# Patient Record
Sex: FEMALE | Race: WHITE | Employment: OTHER | URBAN - METROPOLITAN AREA
[De-identification: names, ages, dates, MRNs, and addresses within clinical notes are randomized per-mention and may not be internally consistent; named-entity substitution may affect disease eponyms.]

---

## 2020-08-05 ENCOUNTER — HOSPITAL ENCOUNTER (OUTPATIENT)
Age: 65
Setting detail: SPECIMEN
Discharge: HOME | End: 2020-08-05
Payer: COMMERCIAL

## 2020-08-11 LAB — CYTOLOGY REPORT: NORMAL

## 2021-08-18 ENCOUNTER — OFFICE VISIT (OUTPATIENT)
Dept: OBGYN CLINIC | Age: 66
End: 2021-08-18
Payer: COMMERCIAL

## 2021-08-18 ENCOUNTER — HOSPITAL ENCOUNTER (OUTPATIENT)
Age: 66
Setting detail: SPECIMEN
Discharge: HOME | End: 2021-08-18
Payer: COMMERCIAL

## 2021-08-18 VITALS
WEIGHT: 110 LBS | BODY MASS INDEX: 18.78 KG/M2 | DIASTOLIC BLOOD PRESSURE: 72 MMHG | SYSTOLIC BLOOD PRESSURE: 100 MMHG | HEIGHT: 64 IN

## 2021-08-18 DIAGNOSIS — Z01.419 WOMEN'S ANNUAL ROUTINE GYNECOLOGICAL EXAMINATION: Primary | ICD-10-CM

## 2021-08-18 PROCEDURE — 99397 PER PM REEVAL EST PAT 65+ YR: CPT | Performed by: OBSTETRICS & GYNECOLOGY

## 2021-08-18 RX ORDER — TRIAMCINOLONE ACETONIDE 0.25 MG/G
OINTMENT TOPICAL
COMMUNITY
Start: 2021-08-17 | End: 2022-08-24

## 2021-08-18 RX ORDER — CYCLOSPORINE 0.5 MG/ML
EMULSION OPHTHALMIC
COMMUNITY
Start: 2021-07-29

## 2021-08-18 RX ORDER — B-COMPLEX WITH VITAMIN C
TABLET ORAL
COMMUNITY

## 2021-08-18 RX ORDER — VALACYCLOVIR HYDROCHLORIDE 500 MG/1
TABLET, FILM COATED ORAL
COMMUNITY
Start: 2021-08-16 | End: 2022-08-24

## 2021-08-18 ASSESSMENT — ENCOUNTER SYMPTOMS
SHORTNESS OF BREATH: 0
DIARRHEA: 0
CONSTIPATION: 0
ABDOMINAL PAIN: 0

## 2021-08-18 NOTE — PATIENT INSTRUCTIONS
pregnant. It could harm the unborn baby or cause birth defects. Use effective birth control to prevent pregnancy while you are using this medicine. You may need to have a negative pregnancy test before starting this treatment. You should not breastfeed while using ospemifene. Ospemifene is not approved for use by anyone younger than 25years old. How should I take ospemifene? Follow all directions on your prescription label and read all medication guides or instruction sheets. Use the medicine exactly as directed. Take with food. Ospemifene may increase your risk of developing a condition that can lead to uterine cancer. To help lower this risk, your doctor may also want you to take a progestin. Report any unusual vaginal bleeding right away. Your doctor should check your progress on a regular basis to determine whether you should continue this treatment. Self-examine your breasts for lumps on a monthly basis, and have regular pelvic exams and mammograms while taking ospemifene. If you need major surgery or will be on long-term bed rest, you may need to stop using this medicine for a short time. Any doctor or surgeon who treats you should know that you are using ospemifene. Store at room temperature away from moisture and heat. What happens if I miss a dose? Take the medicine as soon as you can, but skip the missed dose if it is almost time for your next dose. Do not take two doses at one time. What happens if I overdose? Seek emergency medical attention or call the Poison Help line at 1-682.617.6933. What should I avoid while taking ospemifene? Follow your doctor's instructions about any restrictions on food, beverages, or activity. What are the possible side effects of ospemifene? Get emergency medical help if you have any of these signs of an allergic reaction: hives; difficult breathing; swelling of your face, lips, tongue, or throat.   Call your doctor at once if you have:  · signs of a stroke --sudden numbness or weakness (especially on one side of the body), severe headache, slurred speech, balance problems;  · signs of a blood clot in the lung --chest pain, sudden cough, wheezing, rapid breathing, coughing up blood;  · signs of a blood clot deep in the body --swelling, warmth, or redness in an arm or leg; or  · any unusual vaginal bleeding. Common side effects may include:  · vaginal discharge or heavy bleeding;  · hot flashes;  · headache;  · increased sweating, night sweats; or  · muscle spasm. This is not a complete list of side effects and others may occur. Call your doctor for medical advice about side effects. You may report side effects to FDA at 7-748-FDA-3517. What other drugs will affect ospemifene? Sometimes it is not safe to use certain medications at the same time. Some drugs can affect your blood levels of other drugs you take, which may increase side effects or make the medications less effective. Tell your doctor about all your other medicines, especially:  · birth control pills; or  · hormone replacement therapy. Where can I get more information? Your pharmacist can provide more information about ospemifene. Remember, keep this and all other medicines out of the reach of children, never share your medicines with others, and use this medication only for the indication prescribed. Every effort has been made to ensure that the information provided by [address] is accurate, up-to-date, and complete, but no guarantee is made to that effect. Drug information contained herein may be time sensitive. [facility] information has been compiled for use by healthcare practitioners and consumers in the [employer] and therefore [facility] does not warrant that uses outside of the [employer] are appropriate, unless specifically indicated otherwise. [facility]'s drug information does not endorse drugs, diagnose patients or recommend therapy.  [facility]'s drug information is an informational resource designed to assist licensed healthcare practitioners in caring for their patients and/or to serve consumers viewing this service as a supplement to, and not a substitute for, the expertise, skill, knowledge and judgment of healthcare practitioners. The absence of a warning for a given drug or drug combination in no way should be construed to indicate that the drug or drug combination is safe, effective or appropriate for any given patient. [facility] does not assume any responsibility for any aspect of healthcare administered with the aid of information [facility] provides. The information contained herein is not intended to cover all possible uses, directions, precautions, warnings, drug interactions, allergic reactions, or adverse effects. If you have questions about the drugs you are taking, check with your doctor, nurse or pharmacist.  Copyright 5026-4861 [address]. Version: 3.01. Revision date: 1/31/2020. Care instructions adapted under license by [facility] ([facility]). If you have questions about a medical condition or this instruction, always ask your healthcare professional. [place] [address] any warranty or liability for your use of this information.

## 2021-08-18 NOTE — PROGRESS NOTES
DATE OF VISIT:  21  PATIENT NAME:  Rehana Barnes     YOB: 1955    76 y.o. Chief Complaint   Patient presents with    Annual Exam     Last mamm 2021. bone density . colonoscopy . Needs pap. No LMP recorded. Patient is postmenopausal.           Primary Care Physician: Garett Clemens DO    The patient was seen and examined. She has no chief complaint today and is here for her annual exam.  Her bowels are regular. There are no voiding complaints. She denies any bloating. She denies vaginal discharge and was counseled on STD's and the need for barriercontraception. HPI : Rehana Barnes is a 76 y.o. female  who presents today for her annual.  Has worsening vaginal atrophy. Was on new medication last year that was monthly. She has appt with pcp coming up to get next bone density. 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     Glaucoma     History of skin cancer     Osteoporosis                                                                    Past Surgical History:   Procedure Laterality Date    CERVIX BIOPSY      cone biopsy -  carcinoma in situ    FIXATION KYPHOPLASTY      L#    HYSTERECTOMY, VAGINAL  2011     Family History   Problem Relation Age of Onset    Heart Disease Father     Hypertension Father     Stroke Father     Hypertension Mother      Social History     Socioeconomic History    Marital status: Unknown     Spouse name: Not on file    Number of children: Not on file    Years of education: Not on file    Highest education level: Not on file   Occupational History    Not on file   Tobacco Use    Smoking status: Never Smoker    Smokeless tobacco: Never Used   Substance and Sexual Activity    Alcohol use: Yes     Comment: couple times/week    Drug use: Never    Sexual activity: Not on file   Other Topics Concern    Not on file   Social History Narrative    Not on file     Social Determinants of Health     Financial Resource Strain:     Difficulty of Paying Living Expenses:    Food Insecurity:     Worried About Running Out of Food in the Last Year:     [address] in the Last Year:    Transportation Needs:     Lack of Transportation (Medical):  Lack of Transportation (Non-Medical):    Physical Activity:     Days of Exercise per Week:     Minutes of Exercise per Session:    Stress:     Feeling of Stress :    Social Connections:     Frequency of Communication with Friends and Family:     Frequency of Social Gatherings with Friends and Family:     Attends [employer] Services:     Active Member of Clubs or Organizations:     Attends Club or Organization Meetings:     Marital Status:    Intimate Partner Violence:     Fear of Current or Ex-Partner:     Emotionally Abused:     Physically Abused:     Sexually Abused:      Vitals:    08/18/21 0740   BP: 100/72   Site: Right Upper Arm   Position: Sitting   Weight: 110 lb (49.9 kg)   Height: 5' 3.5\" (1.613 m)     Body mass index is 19.18 kg/m². No LMP recorded.  Patient is postmenopausal.    MEDICATIONS:  Current Outpatient Medications   Medication Sig Dispense Refill    RESTASIS 0.05 % ophthalmic emulsion Instill 1 drop into each eye twice daily      Calcium Carbonate-Vitamin D (OYSTER SHELL CALCIUM/D) 500-200 MG-UNIT TABS Take by mouth      Efinaconazole 10 % SOLN Apply topically      triamcinolone (KENALOG) 0.025 % ointment Apply topically      Omega-3 Fatty Acids (OMEGA 3 PO) Take by mouth      Denosumab (PROLIA SC) Inject into the skin      valACYclovir (VALTREX) 500 MG tablet TAKE 1 TABLET BY MOUTH TWO TIMES A DAY FOR 3 DAYS AS NEEDED for outbreak (Patient not taking: Reported on 8/18/2021)       No current facility-administered medications for this visit. ALLERGIES:  Allergies as of 08/18/2021 - Fully Reviewed 08/18/2021   Allergen Reaction Noted    Adhesive tape  08/18/2021    Bactrim [sulfamethoxazole-trimethoprim] Rash 08/05/2021    Penicillins Rash 08/05/2021           Symptoms of decreased mood absent    **If either question is answered in a  positive fashion then completethe PHQ9 Scoring Evaluation and make the appropriate referral**      Gynecologic History:   No LMP recorded. Patient is postmenopausal.    Sexually Active: Yes    STD History: Yes hpv     Hormone Replacement Exposure: Yes vag estrogen at one time      Genetic Qualified Family History of Breast, Ovarian , Colon or Uterine Cancer:No   If YES see scanned worksheet. Preventative Health Testing:  Colposcopy History:   Date of Last Mammogram: 2021  Date of Last Colonoscopy:   Date of Last Bone Density:      ______________________________________________________________________    REVIEW OF SYSTEMS:       Review of Systems   Constitutional: Negative for chills, fatigue and fever. Respiratory: Negative for shortness of breath. Cardiovascular: Negative for chest pain. Gastrointestinal: Negative for abdominal pain, constipation and diarrhea. Genitourinary: Positive for dyspareunia. Negative for dysuria, enuresis, frequency, menstrual problem, pelvic pain, urgency and vaginal bleeding. Neurological: Negative for dizziness, light-headedness and headaches. PHYSICAL EXAM:    Physical Exam  Constitutional:       Appearance: Normal appearance. Genitourinary:      Pelvic exam was performed with patient in the lithotomy position. Vulva, vagina, right adnexa and left adnexa normal.      Vaginal atrophy present. Cervix is absent. Uterus is absent.    HENT:      Head: Atraumatic. Mouth/Throat:      Mouth: Mucous membranes are moist.   Eyes:      Extraocular Movements: Extraocular movements intact. Pupils: Pupils are equal, round, and reactive to light. Cardiovascular:      Rate and Rhythm: Normal rate. Pulmonary:      Effort: Pulmonary effort is normal.   Chest:      Breasts:         Right: Normal.         Left: Normal.   Abdominal:      General: There is no distension. Palpations: Abdomen is soft. Tenderness: There is no abdominal tenderness. Musculoskeletal:         General: Normal range of motion. Cervical back: Normal range of motion. Neurological:      Mental Status: She is alert and oriented to person, place, and time. Skin:     General: Skin is warm and dry. Psychiatric:         Mood and Affect: Mood normal.         Behavior: Behavior normal.         Thought Content: Thought content normal.         Judgment: Judgment normal.   Chaperone present: chaperone declined. POC Cultures:  No results found for this visit on 08/18/21. ASSESSMENT:      76 y.o. Annual  1. Women's annual routine gynecological examination          There are no problems to display for this patient. Hereditary Breast, Ovarian, Colon and Uterine Cancer screening Done. Tobacco & Secondary smoke risks reviewed; instructed on cessation and avoidance    PLAN:    Return in about 1 year (around 8/18/2022) for annual.  Orders Placed This Encounter   Procedures    PAP SMEAR   Pt will consider osphena or vagifem and let us know. Repeat Annual every 1 year  Cervical Cytology Evaluation begins at 24years old. If Negative Cytology, Follow-up screening per current guidelines. Mammograms every 1year if 37 yo and last mammogram was negative. Calcium and Vitamin D dosing reviewed. Colonoscopy screening reviewed as well as onset for bone density testing. Routine healthmaintenance per patients PCP.     Electronicallysigned by:  Clarissa Sarkar Douglas Arana DO on 08/18/21

## 2021-08-20 LAB
HPV SAMPLE: ABNORMAL
HPV, GENOTYPE 16: NOT DETECTED
HPV, GENOTYPE 18: NOT DETECTED
HPV, HIGH RISK OTHER: DETECTED
HPV, INTERPRETATION: ABNORMAL
SPECIMEN DESCRIPTION: ABNORMAL

## 2021-08-31 LAB — CYTOLOGY REPORT: NORMAL

## 2021-09-16 ENCOUNTER — TELEPHONE (OUTPATIENT)
Dept: OBGYN CLINIC | Age: 66
End: 2021-09-16

## 2021-11-04 ENCOUNTER — PROCEDURE VISIT (OUTPATIENT)
Dept: OBGYN CLINIC | Age: 66
End: 2021-11-04
Payer: COMMERCIAL

## 2021-11-04 ENCOUNTER — HOSPITAL ENCOUNTER (OUTPATIENT)
Age: 66
Setting detail: SPECIMEN
Discharge: HOME | End: 2021-11-04
Payer: COMMERCIAL

## 2021-11-04 DIAGNOSIS — R87.611 PAP SMEAR OF CERVIX WITH ASCUS, CANNOT EXCLUDE HGSIL: Primary | ICD-10-CM

## 2021-11-04 PROCEDURE — 57421 EXAM/BIOPSY OF VAG W/SCOPE: CPT | Performed by: OBSTETRICS & GYNECOLOGY

## 2021-11-04 NOTE — PROGRESS NOTES
Colposcopy Procedure Note    Indications: Pap smear 1 months ago showed: ASC cannot exclude high grade lesion [facility]). The prior pap showed inflammation. Prior cervical/vaginal disease: hx of cervical dysplasia before hysterectomy. Prior cervical treatment: hysterectomy. Procedure Details   The risks and benefits of the procedure and Verbal informed consent obtained. Speculum placed in vagina and excellent visualization of cervix achieved, cervix swabbed x 3 with acetic acid solution. Findings:  Cervix: acetowhite lesion(s) noted at 9 o'clock; endocervical lesion noted at 9 o'clock, vaginal biopsies taken at 9 o'clock, specimen labelled and sent to pathology and hemostasis achieved with Monsel's solution. Vaginal inspection: normal without visible lesions. Vulvar colposcopy: vulvar colposcopy not performed. Specimens: 8:27    Complications: none. Plan:  Specimens labelled and sent to Pathology. Will base further treatment on Pathology findings. Treatment options discussed with patient.

## 2021-11-08 ENCOUNTER — TELEPHONE (OUTPATIENT)
Dept: OBGYN CLINIC | Age: 66
End: 2021-11-08

## 2021-11-08 NOTE — TELEPHONE ENCOUNTER
Received a call from a pathologist at [facility], patient was here for a colpo last week and he needed to confirm location. Your office note is documented that she has a cervix and the biopsy site is endocervical and vaginal at 9:00. Confirmed that it is a vaginal biopsy as patient has had a hyst.  You may want to modify your note to specify that she does not have a cervix.

## 2021-11-09 LAB — SURGICAL PATHOLOGY REPORT: NORMAL

## 2022-06-20 ENCOUNTER — TELEPHONE (OUTPATIENT)
Dept: OBGYN CLINIC | Age: 67
End: 2022-06-20

## 2022-06-20 NOTE — TELEPHONE ENCOUNTER
Had a reminder in [facility] that patient needs to use vaginal estrogen for 4 weeks before her next pap per Dr. Pavon Plants. Sample of Premarin vaginal cream up front for patient to . Patient verbalized understanding, no further questions/concerns voiced.

## 2022-08-11 NOTE — PROGRESS NOTES
[facility]AR[facility] PHYSICAL    Date of service: 2022    Maren Youngblood  Is a 78 y.o.   female    PT's PCP is: Marisol Serrano DO     : 1955                                         Chaperone for Intimate Exam  Chaperone was offered as part of the rooming process. Patient declined and agrees to continue with exam without a chaperone. Chaperone: na      Subjective:       No LMP recorded.  Patient is postmenopausal.     Are your menses regular: not applicable    OB History    Para Term  AB Living   2 2       2   SAB IAB Ectopic Molar Multiple Live Births             2      # Outcome Date GA Lbr Familia/2nd Weight Sex Delivery Anes PTL Lv   2 Para            1 Para                 Social History     Tobacco Use   Smoking Status Never   Smokeless Tobacco Never        Social History     Substance and Sexual Activity   Alcohol Use Yes    Comment: couple times/week       Family History   Problem Relation Age of Onset    Heart Disease Father     Hypertension Father     Stroke Father     Hypertension Mother        Any family history of breast or ovarian cancer: No    Any family history of blood clots: No      Allergies: Adhesive tape, Bactrim [sulfamethoxazole-trimethoprim], and Penicillins      Current Outpatient Medications:     valACYclovir (VALTREX) 500 MG tablet, Take 500 mg by mouth daily, Disp: , Rfl:     timolol (TIMOPTIC) 0.5 % ophthalmic solution, INSTILL 1 DROP IN EACH EYE IN THE MORNING, Disp: , Rfl:     RESTASIS 0.05 % ophthalmic emulsion, Instill 1 drop into each eye twice daily, Disp: , Rfl:     Calcium Carbonate-Vitamin D (OYSTER SHELL CALCIUM/D) 500-200 MG-UNIT TABS, Take by mouth, Disp: , Rfl:     Omega-3 Fatty Acids (OMEGA 3 PO), Take by mouth, Disp: , Rfl:     Denosumab (PROLIA SC), Inject into the skin, Disp: , Rfl:     Social History     Substance and Sexual Activity   Sexual Activity Not on file       Any bleeding or pain with intercourse: Yes-pain due to atrophy    Last Yearly:  8-    Last pap: 8- ASCUS    Last HPV: 8- POSITIVE    Last Mammogram: 8-    Last Dexascan 7-    Last colorectal screen- type:colonoscopy  date 2020    Do you do self breast exams: Yes    Past Medical History:   Diagnosis Date    Abnormal Pap smear of cervix     +HPV 2014, +HPV 2017. 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 2021 pap ASCUS-H, +HPV. Glaucoma     History of skin cancer     Osteoporosis        Past Surgical History:   Procedure Laterality Date    CERVIX BIOPSY      cone biopsy -  carcinoma in situ    FIXATION KYPHOPLASTY  2019    L#    HYSTERECTOMY, VAGINAL  2011       Family History   Problem Relation Age of Onset    Heart Disease Father     Hypertension Father     Stroke Father     Hypertension Mother        Chief Complaint   Patient presents with    Annual Exam     Pt. Denies concerns. Pt. Is now taking Valtrex daily. She got from Dr. Sanchez Livingston for cold sores which were flaring up due to stress. Pt. Has been using Premarin cream to prep for today's pap. Last colpo showed atrophy. PE:  Vital Signs  Blood pressure 118/72, height 5' 4\" (1.626 m), weight 113 lb (51.3 kg). Estimated body mass index is 19.4 kg/m² as calculated from the following:    Height as of this encounter: 5' 4\" (1.626 m). Weight as of this encounter: 113 lb (51.3 kg). Labs:    No results found for this visit on 08/24/22. No data recorded    NURSE: Desiree Rosen    HPI: here for annual exam - has used premarin to see if she can get nl pap    Review of Systems   Constitutional:  Negative for chills, fatigue and fever. Respiratory:  Negative for shortness of breath. Cardiovascular:  Negative for chest pain. Gastrointestinal:  Negative for abdominal pain, constipation and diarrhea. Genitourinary:  Negative for dysuria, enuresis, frequency, menstrual problem, pelvic pain, urgency and vaginal bleeding. Neurological:  Negative for dizziness, light-headedness and headaches. Physical Exam  Constitutional:       Appearance: Normal appearance. Genitourinary:      Vulva, bladder and urethral meatus normal.      Right Labia: No rash or lesions. Left Labia: No lesions or rash. No labial fusion noted. No vaginal discharge. No vaginal prolapse present. No vaginal atrophy present. Right Adnexa: not tender and no mass present. Left Adnexa: not tender and no mass present. No cervical motion tenderness, discharge, friability or lesion. No parametrium nodularity present. Uterus is not enlarged or tender. Breasts:     Breasts are soft. Right: No inverted nipple, mass, nipple discharge, skin change or tenderness. Left: No inverted nipple, mass, nipple discharge, skin change or tenderness. HENT:      Head: Normocephalic and atraumatic. Eyes:      Extraocular Movements: Extraocular movements intact. Pupils: Pupils are equal, round, and reactive to light. Cardiovascular:      Rate and Rhythm: Normal rate. Pulmonary:      Effort: Pulmonary effort is normal.   Abdominal:      General: There is no distension. Palpations: Abdomen is soft. There is no mass. Tenderness: There is no abdominal tenderness. Musculoskeletal:         General: Normal range of motion. Cervical back: Normal range of motion. Neurological:      General: No focal deficit present. Mental Status: She is alert and oriented to person, place, and time. Skin:     General: Skin is warm and dry. Psychiatric:         Mood and Affect: Mood normal.         Behavior: Behavior normal.         Thought Content: Thought content normal.         Judgment: Judgment normal.                                 Assessment and Plan          Diagnosis Orders   1. Women's annual routine gynecological examination        2.  History of abnormal cervical Pap smear            Repeat Annual every 1

## 2022-08-24 ENCOUNTER — OFFICE VISIT (OUTPATIENT)
Dept: OBGYN CLINIC | Age: 67
End: 2022-08-24
Payer: MEDICARE

## 2022-08-24 ENCOUNTER — HOSPITAL ENCOUNTER (OUTPATIENT)
Age: 67
Setting detail: SPECIMEN
Discharge: HOME | End: 2022-08-24

## 2022-08-24 VITALS
BODY MASS INDEX: 19.29 KG/M2 | SYSTOLIC BLOOD PRESSURE: 118 MMHG | HEIGHT: 64 IN | WEIGHT: 113 LBS | DIASTOLIC BLOOD PRESSURE: 72 MMHG

## 2022-08-24 DIAGNOSIS — Z01.419 WOMEN'S ANNUAL ROUTINE GYNECOLOGICAL EXAMINATION: Primary | ICD-10-CM

## 2022-08-24 DIAGNOSIS — Z87.42 HISTORY OF ABNORMAL CERVICAL PAP SMEAR: ICD-10-CM

## 2022-08-24 PROCEDURE — G0101 CA SCREEN;PELVIC/BREAST EXAM: HCPCS | Performed by: OBSTETRICS & GYNECOLOGY

## 2022-08-24 RX ORDER — TIMOLOL MALEATE 5 MG/ML
SOLUTION/ DROPS OPHTHALMIC
COMMUNITY
Start: 2022-08-01

## 2022-08-24 RX ORDER — VALACYCLOVIR HYDROCHLORIDE 500 MG/1
500 TABLET, FILM COATED ORAL DAILY
COMMUNITY

## 2022-08-24 ASSESSMENT — ENCOUNTER SYMPTOMS
CONSTIPATION: 0
ABDOMINAL PAIN: 0
DIARRHEA: 0
SHORTNESS OF BREATH: 0

## 2022-09-02 LAB — CYTOLOGY REPORT: NORMAL

## 2022-10-06 ENCOUNTER — HOSPITAL ENCOUNTER (OUTPATIENT)
Age: 67
Setting detail: SPECIMEN
Discharge: HOME | End: 2022-10-06

## 2022-10-06 ENCOUNTER — PROCEDURE VISIT (OUTPATIENT)
Dept: OBGYN CLINIC | Age: 67
End: 2022-10-06
Payer: MEDICARE

## 2022-10-06 DIAGNOSIS — B97.7 HPV IN FEMALE: Primary | ICD-10-CM

## 2022-10-06 DIAGNOSIS — R87.622 LGSIL PAP SMEAR OF VAGINA: ICD-10-CM

## 2022-10-06 PROCEDURE — 57421 EXAM/BIOPSY OF VAG W/SCOPE: CPT | Performed by: OBSTETRICS & GYNECOLOGY

## 2022-10-06 NOTE — PROGRESS NOTES
Pt with pap in 2021 that showed ASCUS +HVP last year and colpo showed inflammation and atrophy; this year pap with LGSIL and +HPV; hysterectomy specimen had no cervical dysplasia; monogamous relationship; disc'd referral to gyn onc for guidance re follow up  Colposcopy Procedure Note    Indications: Pap smear 1 months ago showed: low-grade squamous intraepithelial neoplasia (LGSIL - encompassing HPV,mild dysplasia,FRANCO I). The prior pap showed ASCUS with POSITIVE high risk HPV. Prior cervical/vaginal disease: atrophy. Prior cervical treatment: hysterectomy     Procedure Details   The risks and benefits of the procedure and Verbal informed consent obtained. Speculum placed in vagina and excellent visualization of cervix achieved, cervix swabbed x 3 with acetic acid solution. Findings:  Vaginal inspection acetowhite lesion(s) noted at midline cuff SCJ visualized - lesion at midline cuff and biopsy taken, specimen labelled and sent to pathology, and hemostasis achieved with Monsel's solution. Vulvar colposcopy: vulvar colposcopy not performed. Specimens: midline cuff    Complications: none. Plan:  Specimens labelled and sent to Pathology. Will base further treatment on Pathology findings. Treatment options discussed with patient.

## 2022-10-10 LAB — SURGICAL PATHOLOGY REPORT: NORMAL

## 2022-10-21 ENCOUNTER — INITIAL CONSULT (OUTPATIENT)
Dept: GYNECOLOGIC ONCOLOGY | Age: 67
End: 2022-10-21
Payer: MEDICARE

## 2022-10-21 VITALS
BODY MASS INDEX: 19.7 KG/M2 | OXYGEN SATURATION: 100 % | WEIGHT: 115.4 LBS | SYSTOLIC BLOOD PRESSURE: 135 MMHG | DIASTOLIC BLOOD PRESSURE: 73 MMHG | HEIGHT: 64 IN | HEART RATE: 66 BPM

## 2022-10-21 DIAGNOSIS — N89.3 VAGINAL DYSPLASIA: Primary | ICD-10-CM

## 2022-10-21 PROCEDURE — 99205 OFFICE O/P NEW HI 60 MIN: CPT | Performed by: OBSTETRICS & GYNECOLOGY

## 2022-10-21 PROCEDURE — 1123F ACP DISCUSS/DSCN MKR DOCD: CPT | Performed by: OBSTETRICS & GYNECOLOGY

## 2022-10-21 PROCEDURE — 57420 EXAM OF VAGINA W/SCOPE: CPT | Performed by: OBSTETRICS & GYNECOLOGY

## 2022-10-21 ASSESSMENT — ENCOUNTER SYMPTOMS
RESPIRATORY NEGATIVE: 1
GASTROINTESTINAL NEGATIVE: 1
EYES NEGATIVE: 1

## 2022-10-21 NOTE — PROGRESS NOTES
Review of Systems   Constitutional: Negative. HENT:  Negative. Eyes: Negative. Respiratory: Negative. Cardiovascular: Negative. Gastrointestinal: Negative. Endocrine: Negative. Genitourinary: Negative. Musculoskeletal: Negative. Skin: Negative. Neurological: Negative. Hematological: Negative.

## 2022-10-21 NOTE — PROGRESS NOTES
[address] [facility] [facility], [facility], [address]  [facility] [address]      I am seeing Jake Dempsey for Consultation at the request of Dr. Felicia Card. Chief Complaint:  Persistent Dysplasia and HPV    HPI  She is a 75 y.o. female who is being referred due to persistent abnormal pap smears and HPV positive status. The patient reports about 40 years of abnormal pap smears. She had previously underwent a LEEP procedure in her late 25s with persistent dysplasia after that. The patient underwent robotic assisted hysterectomy in 2011 with no concerns since then. The patient has been getting yearly vaginal pap smears since that time. In 8/2021 the patient had an ASC-H pap smear with HPV other high risk positive status. Follow up vaginal biopsy in November, 2021 revealed atrophy and inflammation without evidence of dysplasia. 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 She was referred to Gyn Onc due to these persistent changes. Review of Systems  I have personally reviewed and agree with the review of systems done by my ancillary staff in the [facility] documentation. Past Medical History:   Diagnosis Date    Abnormal Pap smear of cervix     +HPV 2014, +HPV 2017. 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 2021 pap ASCUS-H, +HPV.     Glaucoma     History of skin cancer     Osteoporosis          Past Surgical History:   Procedure Laterality Date    CERVIX BIOPSY      cone biopsy -  carcinoma in situ    FIXATION KYPHOPLASTY  2019    L#    HYSTERECTOMY, VAGINAL  2011         Family History   Problem Relation Age of Onset    Heart Disease Father     Hypertension Father     Stroke Father     Hypertension Mother          Social History     Tobacco Use    Smoking status: Never    Smokeless tobacco: Never   Substance Use Topics    Alcohol use: Yes     Comment: couple times/week    Drug use: Never Allergies   Allergen Reactions    Adhesive Tape      Steri-strips only. Can use band-aids    Bactrim [Sulfamethoxazole-Trimethoprim] Rash    Penicillins Rash         OBJECTIVE:  Vitals:    10/21/22 0808   BP: 135/73   Site: Left Upper Arm   Position: Sitting   Cuff Size: Medium Adult   Pulse: 66   SpO2: 100%   Weight: 115 lb 6.4 oz (52.3 kg)   Height: 5' 4\" (1.626 m)       General: Well appearing, alert and oriented x3, no acute distress    HEENT:  EOM intact, SABRINA, no cervical or supraclavicular lymphadenopathy. No Thyromegaly. Heart: Auscultation of heart revels a regular rate with no murmur, gallops, or rubs. Lungs:  Clear bilaterally to auscultation to the bases. CVA: No tenderness. Abdomen: Soft, nontender. No hepatosplenomegaly. No guarding, rebound, or rigidity. Lower Extremities:  No lymphedema, no calf tenderness, no musculoskeletal deformities. Pelvic Exam:  Normal appearing external genitalia. Small nevus noted just superior and lateral of the clitoris on the right which patient reports has been monitored by her dermatologist and present for a long time. Normal appearing urethra. Vaginal mucosa appears atrophic. Vaginal cuff intact. Vaginal apex notable for healing area from previous biopsy site. Colposcopy performed. Acetic acid applied to vagina and cuff. Some low grade changes noted at the 12-12 o'clock region as well as the 4 o'clock region. No high grade changes or lesions concerning for cancer noted.     Assessment/Plan:  Persistent Dysplasia and HPV    - Patient has about 40 year history of abnormal pap smears    - S/p minimally invasive hysterectomy in 2011    - Patient follows for yearly pap smears of the vagina with Dr. Josemanuel Doyle and has persistent abnormal pap smears.    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    - Colposcopy performed today, some low grade changes noted at 11-12 o'clock and 4 o'clock region, no high grade changes or lesions concerning for malignancy noted    - Persistent HPV positive status noted however has remained with low grade cytologic changes on pap smears    - Recommend follow up in 6 months for follow up vaginal pap smear to ensure no progression of vaginal changes    - If symptoms persist or worsen, will consider CO2 ablation of the lesions    Electronically signed by Byron Webster DO on 10/21/22 at 8:15 AM EDT    Gyn Oncology Attending Note    Patient seen and fully evaluated by me today. Chart, notes, records, history reviewed by me in detail. The patient tells me today that I have answered all of her questions to her satisfaction. She tells me that she better understands her problems and that she is much relieved after our consultation today. The patient appears to have some persistent HPV troubles. She has mild vaginal dysplasia. She certainly has no evidence for cancer or microinvasive cancer or high-grade dysplasia on thorough evaluation today. I performed a thorough vaginal colposcopy today. Her vulvar examination is normal.  She has a stable nevus to the right of the clitoris on the anterior vulva. The rest of her medical and surgical history are generally unremarkable. She did have a hysterectomy and BSO in the past due to cervical dysplasia she tells me. We talked about therapeutic options today including vaginal carbon dioxide laser therapy. The patient wants to hold off on any aggressive interventions right now and continue with surveillance with myself and Dr. Servando Varela in the months and years ahead.     She will return to see me in 6 months for repeat vaginal colposcopy    HEBER Aguilar MD

## 2023-04-21 ENCOUNTER — OFFICE VISIT (OUTPATIENT)
Dept: GYNECOLOGIC ONCOLOGY | Age: 68
End: 2023-04-21

## 2023-04-21 VITALS
WEIGHT: 115.8 LBS | HEIGHT: 64 IN | BODY MASS INDEX: 19.77 KG/M2 | DIASTOLIC BLOOD PRESSURE: 67 MMHG | HEART RATE: 67 BPM | SYSTOLIC BLOOD PRESSURE: 123 MMHG | OXYGEN SATURATION: 100 %

## 2023-04-21 DIAGNOSIS — N89.3 VAGINAL DYSPLASIA: Primary | ICD-10-CM

## 2023-04-21 ASSESSMENT — ENCOUNTER SYMPTOMS
GASTROINTESTINAL NEGATIVE: 1
RESPIRATORY NEGATIVE: 1
EYES NEGATIVE: 1

## 2023-04-21 NOTE — PROGRESS NOTES
Gyn Oncology Note    Patient seen and thoroughly evaluated by me today. Chart, notes, records, history revd by me in detail today  She has some persistent mild low grade vaginal dysplasia changes. No new lesions or no progression at this time  She certainly doesn't have any evidence of high grade vaginal dysplasia or cancer today  She has no bleeding, she has no pain  She has atrophy but she is ambivalent about using vaginal ERT for now  She tells me that she understands the situation and follow up plan  She will get semi annual exams with me and Dr David Bravo  She will see her again in August 2023  She will see me again in Feb 2024  She will call or return sooner as needed. She understands that EUA, biopsies and perhaps laser vaporization of lesions may be necessary in future if things change or worsen on close follow up.     Tyra Talley MD
Review of Systems   Constitutional: Negative. HENT:  Negative. Eyes: Negative. Respiratory: Negative. Cardiovascular: Negative. Gastrointestinal: Negative. Endocrine: Negative. Genitourinary: Negative. Musculoskeletal: Negative. Skin: Negative. Neurological: Negative. Hematological: Negative.
Colposcopy today reassuring, return in February, 2024 or sooner if any concerns or if worsening pap over the summer      No follow-ups on file.       Electronically signed by Mayra Feldman DO on 4/21/23 at 8:14 AM EDT

## 2023-09-13 ENCOUNTER — OFFICE VISIT (OUTPATIENT)
Dept: OBGYN CLINIC | Age: 68
End: 2023-09-13

## 2023-09-13 ENCOUNTER — HOSPITAL ENCOUNTER (OUTPATIENT)
Age: 68
Setting detail: SPECIMEN
Discharge: HOME | End: 2023-09-13

## 2023-09-13 VITALS — WEIGHT: 112 LBS | BODY MASS INDEX: 19.22 KG/M2 | DIASTOLIC BLOOD PRESSURE: 84 MMHG | SYSTOLIC BLOOD PRESSURE: 128 MMHG

## 2023-09-13 DIAGNOSIS — R87.610 ASCUS WITH POSITIVE HIGH RISK HPV CERVICAL: ICD-10-CM

## 2023-09-13 DIAGNOSIS — N95.2 ATROPHIC VAGINITIS: ICD-10-CM

## 2023-09-13 DIAGNOSIS — R87.810 ASCUS WITH POSITIVE HIGH RISK HPV CERVICAL: ICD-10-CM

## 2023-09-13 DIAGNOSIS — Z01.419 WOMEN'S ANNUAL ROUTINE GYNECOLOGICAL EXAMINATION: Primary | ICD-10-CM

## 2023-09-13 RX ORDER — ESTRADIOL 10 UG/1
10 INSERT VAGINAL NIGHTLY
Qty: 21 TABLET | Refills: 0 | Status: SHIPPED | OUTPATIENT
Start: 2023-09-13 | End: 2023-10-04

## 2023-09-13 RX ORDER — ESTRADIOL 10 UG/1
10 INSERT VAGINAL
Qty: 8 TABLET | Refills: 12 | Status: SHIPPED | OUTPATIENT
Start: 2023-09-14

## 2023-09-13 RX ORDER — LATANOPROST 50 UG/ML
SOLUTION/ DROPS OPHTHALMIC
COMMUNITY
Start: 2023-07-29

## 2023-09-13 ASSESSMENT — ENCOUNTER SYMPTOMS
SHORTNESS OF BREATH: 0
DIARRHEA: 0
CONSTIPATION: 0
ABDOMINAL PAIN: 0

## 2023-09-15 LAB
HPV I/H RISK 4 DNA CVX QL NAA+PROBE: DETECTED
HPV SAMPLE: ABNORMAL
HPV, INTERPRETATION: ABNORMAL
HPV16 DNA CVX QL NAA+PROBE: NOT DETECTED
HPV18 DNA CVX QL NAA+PROBE: NOT DETECTED
SPECIMEN DESCRIPTION: ABNORMAL

## 2023-09-17 LAB — CYTOLOGY REPORT: NORMAL

## 2024-04-23 NOTE — PROGRESS NOTES
Patient sating 87% on 100% fio2. PEEP increased to 7 per MD.   YEARLY PHYSICAL    Date of service: 2023    Bal Le  Is a 75 y.o. female    PT's PCP is: Ana Maria Cobos DO     : 1955                                         Chaperone for Intimate Exam  Chaperone was offered as part of the rooming process. Patient declined and agrees to continue with exam without a chaperone. Chaperone: N/A      Subjective:       No LMP recorded. Patient has had a hysterectomy.      Are your menses regular: not applicable    OB History    Para Term  AB Living   2 2       2   SAB IAB Ectopic Molar Multiple Live Births             2      # Outcome Date GA Lbr Familia/2nd Weight Sex Delivery Anes PTL Lv   2 Para            1 Para                 Social History     Tobacco Use   Smoking Status Never   Smokeless Tobacco Never        Social History     Substance and Sexual Activity   Alcohol Use Yes    Alcohol/week: 2.0 standard drinks of alcohol    Types: 2 Glasses of wine per week    Comment: couple times/week       Family History   Problem Relation Age of Onset    Hypertension Mother     Heart Disease Father     Hypertension Father     Stroke Father        Any family history of breast or ovarian cancer: No    Any family history of blood clots: No      Allergies: Adhesive tape, Bactrim [sulfamethoxazole-trimethoprim], and Penicillins      Current Outpatient Medications:     latanoprost (XALATAN) 0.005 % ophthalmic solution, Instill one drop in each eye at bedtime, Disp: , Rfl:     [START ON 2023] Estradiol (VAGIFEM) 10 MCG TABS vaginal tablet, Place 1 tablet vaginally Twice a Week, Disp: 8 tablet, Rfl: 12    Estradiol (VAGIFEM) 10 MCG TABS vaginal tablet, Place 1 tablet vaginally at bedtime for 21 days, Disp: 21 tablet, Rfl: 0    valACYclovir (VALTREX) 500 MG tablet, Take 1 tablet by mouth as needed, Disp: , Rfl:     timolol (TIMOPTIC) 0.5 % ophthalmic solution, INSTILL 1 DROP IN [facility] EYE IN THE

## 2024-04-26 ENCOUNTER — OFFICE VISIT (OUTPATIENT)
Dept: GYNECOLOGIC ONCOLOGY | Age: 69
End: 2024-04-26

## 2024-04-26 VITALS
WEIGHT: 115 LBS | HEART RATE: 59 BPM | OXYGEN SATURATION: 98 % | TEMPERATURE: 97.3 F | DIASTOLIC BLOOD PRESSURE: 72 MMHG | SYSTOLIC BLOOD PRESSURE: 128 MMHG | BODY MASS INDEX: 19.74 KG/M2

## 2024-04-26 DIAGNOSIS — N89.3 VAGINAL DYSPLASIA: Primary | ICD-10-CM

## 2024-04-26 RX ORDER — ROSUVASTATIN CALCIUM 5 MG/1
5 TABLET, COATED ORAL DAILY
COMMUNITY
Start: 2024-02-01

## 2024-04-26 ASSESSMENT — ENCOUNTER SYMPTOMS
SCLERAL ICTERUS: 0
CHEST TIGHTNESS: 0
DIARRHEA: 0
BLOOD IN STOOL: 0
ABDOMINAL PAIN: 0
EYE PROBLEMS: 0
HEMOPTYSIS: 0
WHEEZING: 0
ABDOMINAL DISTENTION: 0
TROUBLE SWALLOWING: 0
RECTAL PAIN: 0
NAUSEA: 0
SHORTNESS OF BREATH: 0
VOMITING: 0
CONSTIPATION: 0
COUGH: 0
VOICE CHANGE: 0
SORE THROAT: 0
BACK PAIN: 0

## 2024-04-26 NOTE — PROGRESS NOTES
Review of Systems   Constitutional:  Negative for appetite change, chills, diaphoresis, fatigue, fever and unexpected weight change.   HENT:   Negative for hearing loss, lump/mass, mouth sores, nosebleeds, sore throat, tinnitus, trouble swallowing and voice change.    Eyes:  Negative for eye problems and icterus.   Respiratory:  Negative for chest tightness, cough, hemoptysis, shortness of breath and wheezing.    Cardiovascular:  Negative for chest pain, leg swelling and palpitations.   Gastrointestinal:  Negative for abdominal distention, abdominal pain, blood in stool, constipation, diarrhea, nausea, rectal pain and vomiting.   Endocrine: Negative for hot flashes.   Genitourinary:  Positive for dyspareunia (vaginal dryness). Negative for bladder incontinence, difficulty urinating, dysuria, frequency, hematuria, menstrual problem, nocturia, pelvic pain, vaginal bleeding and vaginal discharge.    Musculoskeletal:  Negative for arthralgias, back pain, flank pain, gait problem, myalgias, neck pain and neck stiffness.   Skin:  Negative for itching, rash and wound.   Neurological:  Negative for dizziness, extremity weakness, gait problem, headaches, light-headedness, numbness, seizures and speech difficulty.   Hematological:  Negative for adenopathy. Does not bruise/bleed easily.   Psychiatric/Behavioral:  Negative for confusion, decreased concentration, depression, sleep disturbance and suicidal ideas. The patient is not nervous/anxious.

## 2024-04-26 NOTE — PROGRESS NOTES
Gynecologic Oncology  [address], [address], [address]  [place] [address]    Jumana Deshpande present for her vaginal dysplasia surveillance visit.     CC/HPI: Patient has a history of hysterectomy in 2011 and has been undergoing yearly vaginal pap smears. In August 2021, patient has an ASC-H pap smear with HPV (high risk other +).   Follow up vaginal biopsy in November, 2021 revealed atrophy and inflammation without evidence of dysplasia. 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 She has been following with Dr. Gaby Underwood for this and was referred to Gyn Onc for assistance managing her persistent low grade changes last October.     Colposcopy performed 10/21/22 revealed some low grade vaginal changes around vaginal cuff, no biopsies taken at that time. Colposcopy was performed again 4/21/23 with stable low grade changes. Repeat colposcopy today shows improvement from last exam with only low grade acetowhite changes noted on right vaginal side wall. Low concern for malignancy at this time.    Patient denies vaginal bleeding. Endorses some continued vaginal dryness likely 2/2 vaginal atrophy and endorses local vaginal estrogen cream use with symptomatic improvement.      ROS:  I have personally reviewed and agree with the review of systems done by my ancillary staff in the [facility] documentation.      Past Medical History:   Diagnosis Date    Abnormal Pap smear of cervix     +HPV 2014, +HPV 2017. 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 2021 pap ASCUS-H, +HPV.    Drug effect As a child    Penicillin    Glaucoma     History of skin cancer     Osteoporosis          Past Surgical History:   Procedure Laterality Date    CERVIX BIOPSY      cone biopsy -  carcinoma in situ    COLPOSCOPY      FIXATION KYPHOPLASTY  2019    L#    HYSTERECTOMY (CERVIX STATUS UNKNOWN)  11/11    HYSTERECTOMY, VAGINAL  2011

## 2024-04-26 NOTE — PROGRESS NOTES
Gyn Oncology Note    Patient seen and thoroughly evaluated by me today  Chart notes records history revd by me in detail today  I spent 30 minutes today in the office managing this case  I answered all questions to her satisfaction    Vaginoscopy reveals no cancer or hi grade dysplasia  She has some mild persistent VAIN I/HPV change at apex   No vulva dysplasia, s/p hysterectomy  Stable anterior vulva nevus noted  Physical exam otherwise unremarkable today  No pelvic masses    Plan:    See Dr Gaby Underwood for vaginal surveillance in six months  Return here in 12 months or sooner sabrina Aguilar MD

## 2024-09-15 SDOH — ECONOMIC STABILITY: FOOD INSECURITY: WITHIN THE PAST 12 MONTHS, YOU WORRIED THAT YOUR FOOD WOULD RUN OUT BEFORE YOU GOT MONEY TO BUY MORE.: NEVER TRUE

## 2024-09-15 SDOH — ECONOMIC STABILITY: FOOD INSECURITY: WITHIN THE PAST 12 MONTHS, THE FOOD YOU BOUGHT JUST DIDN'T LAST AND YOU DIDN'T HAVE MONEY TO GET MORE.: NEVER TRUE

## 2024-09-15 SDOH — ECONOMIC STABILITY: TRANSPORTATION INSECURITY
IN THE PAST 12 MONTHS, HAS LACK OF TRANSPORTATION KEPT YOU FROM MEETINGS, WORK, OR FROM GETTING THINGS NEEDED FOR DAILY LIVING?: NO

## 2024-09-15 SDOH — ECONOMIC STABILITY: INCOME INSECURITY: HOW HARD IS IT FOR YOU TO PAY FOR THE VERY BASICS LIKE FOOD, HOUSING, MEDICAL CARE, AND HEATING?: NOT HARD AT ALL

## 2024-09-15 ASSESSMENT — PATIENT HEALTH QUESTIONNAIRE - PHQ9
SUM OF ALL RESPONSES TO PHQ QUESTIONS 1-9: 0
SUM OF ALL RESPONSES TO PHQ9 QUESTIONS 1 & 2: 0
1. LITTLE INTEREST OR PLEASURE IN DOING THINGS: NOT AT ALL
SUM OF ALL RESPONSES TO PHQ QUESTIONS 1-9: 0
SUM OF ALL RESPONSES TO PHQ QUESTIONS 1-9: 0
2. FEELING DOWN, DEPRESSED OR HOPELESS: NOT AT ALL
SUM OF ALL RESPONSES TO PHQ QUESTIONS 1-9: 0
SUM OF ALL RESPONSES TO PHQ9 QUESTIONS 1 & 2: 0
2. FEELING DOWN, DEPRESSED OR HOPELESS: NOT AT ALL
1. LITTLE INTEREST OR PLEASURE IN DOING THINGS: NOT AT ALL

## 2024-09-18 ENCOUNTER — OFFICE VISIT (OUTPATIENT)
Dept: OBGYN CLINIC | Age: 69
End: 2024-09-18
Payer: MEDICARE

## 2024-09-18 ENCOUNTER — HOSPITAL ENCOUNTER (OUTPATIENT)
Age: 69
Setting detail: SPECIMEN
Discharge: HOME | End: 2024-09-18

## 2024-09-18 VITALS
HEIGHT: 64 IN | DIASTOLIC BLOOD PRESSURE: 72 MMHG | BODY MASS INDEX: 18.95 KG/M2 | WEIGHT: 111 LBS | SYSTOLIC BLOOD PRESSURE: 110 MMHG

## 2024-09-18 DIAGNOSIS — R87.610 ASCUS WITH POSITIVE HIGH RISK HPV CERVICAL: ICD-10-CM

## 2024-09-18 DIAGNOSIS — N95.2 ATROPHIC VAGINITIS: ICD-10-CM

## 2024-09-18 DIAGNOSIS — R87.810 ASCUS WITH POSITIVE HIGH RISK HPV CERVICAL: ICD-10-CM

## 2024-09-18 DIAGNOSIS — Z01.419 WOMEN'S ANNUAL ROUTINE GYNECOLOGICAL EXAMINATION: Primary | ICD-10-CM

## 2024-09-18 PROCEDURE — G0101 CA SCREEN;PELVIC/BREAST EXAM: HCPCS | Performed by: OBSTETRICS & GYNECOLOGY

## 2024-09-18 RX ORDER — ESTRADIOL 10 UG/1
10 INSERT VAGINAL
Qty: 8 TABLET | Refills: 12 | Status: SHIPPED | OUTPATIENT
Start: 2024-09-19

## 2024-09-18 ASSESSMENT — ENCOUNTER SYMPTOMS
DIARRHEA: 0
CONSTIPATION: 0
SHORTNESS OF BREATH: 0
ABDOMINAL PAIN: 0

## 2024-09-24 LAB — CYTOLOGY REPORT: NORMAL

## 2025-04-11 ENCOUNTER — OFFICE VISIT (OUTPATIENT)
Dept: GYNECOLOGIC ONCOLOGY | Age: 70
End: 2025-04-11
Payer: MEDICARE

## 2025-04-11 VITALS
DIASTOLIC BLOOD PRESSURE: 73 MMHG | TEMPERATURE: 97.6 F | HEART RATE: 69 BPM | WEIGHT: 115 LBS | BODY MASS INDEX: 19.74 KG/M2 | SYSTOLIC BLOOD PRESSURE: 126 MMHG | OXYGEN SATURATION: 100 %

## 2025-04-11 DIAGNOSIS — N89.3 VAGINAL DYSPLASIA: Primary | ICD-10-CM

## 2025-04-11 PROCEDURE — 99213 OFFICE O/P EST LOW 20 MIN: CPT | Performed by: OBSTETRICS & GYNECOLOGY

## 2025-04-11 PROCEDURE — 1126F AMNT PAIN NOTED NONE PRSNT: CPT | Performed by: OBSTETRICS & GYNECOLOGY

## 2025-04-11 PROCEDURE — 1159F MED LIST DOCD IN RCRD: CPT | Performed by: OBSTETRICS & GYNECOLOGY

## 2025-04-11 PROCEDURE — 1123F ACP DISCUSS/DSCN MKR DOCD: CPT | Performed by: OBSTETRICS & GYNECOLOGY

## 2025-04-11 ASSESSMENT — ENCOUNTER SYMPTOMS
SCLERAL ICTERUS: 0
EYE PROBLEMS: 0
ABDOMINAL PAIN: 0
WHEEZING: 0
BACK PAIN: 0
COUGH: 0
VOMITING: 0
SORE THROAT: 0
HEMOPTYSIS: 0
RECTAL PAIN: 0
BLOOD IN STOOL: 0
CHEST TIGHTNESS: 0
VOICE CHANGE: 0
CONSTIPATION: 0
ABDOMINAL DISTENTION: 0
NAUSEA: 0
TROUBLE SWALLOWING: 0
SHORTNESS OF BREATH: 0
DIARRHEA: 0

## 2025-04-11 NOTE — PROGRESS NOTES
[facility] Gynecologic Oncology  [address], [address], [address]  [facility] [place] [address]    Jumana Deshpande present for her Persistent HPV and Vaginal Dysplasia surveillance visit.     CC/HPI: She has a history of Persistent HPV and Vaginal Dysplasia. She has a history of total hysterectomy.    Today she is doing well and has no complaints. She follows with her OBGYN and had a recent pap smear confirming chronic vaginal dysplasia and HPV.     ROS:  I have personally reviewed and agree with the review of systems done by my ancillary staff in the EP[facility] documentation.      Past Medical History:   Diagnosis Date    Abnormal Pap smear of cervix     +HPV 2014, +HPV 2017. 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 2021 pap ASCUS-H, +HPV.    Drug effect As a child    Penicillin    Glaucoma     History of skin cancer     Osteoporosis        Past Surgical History:   Procedure Laterality Date    CERVIX BIOPSY      cone biopsy -  carcinoma in situ    COLPOSCOPY      FIXATION KYPHOPLASTY  2019    L#    HYSTERECTOMY (CERVIX STATUS UNKNOWN)  11/11    HYSTERECTOMY, VAGINAL  2011         Family History   Problem Relation Age of Onset    Hypertension Mother     Heart Disease Father     Hypertension Father     Stroke Father          Social History     Tobacco Use    Smoking status: Never    Smokeless tobacco: Never   Substance Use Topics    Alcohol use: Yes     Alcohol/week: 2.0 standard drinks of alcohol     Types: 2 Glasses of wine per week     Comment: couple times/week    Drug use: Never         Allergies   Allergen Reactions    Adhesive Tape      Steri-strips only.  Can use band-aids    Alendronate     Bactrim [Sulfamethoxazole-Trimethoprim] Rash    Ibandronate     Penicillins Rash         Physical Exam:    There were no vitals filed for this visit.    General: well- appearing, no acute distress, alert and oriented x 3    HEENT: Thyroid normal size. No cervical or supraclavicular lymphadenopathy.    Lungs: no increased WOB    No CVA

## 2025-04-11 NOTE — PROGRESS NOTES
Gyn Onc Note    Patient seen and evaluated by me today  Chart notes records labs cytology HPV testing and previous exams revd in detail today  I spent 20 minutes in office today managing this case    All questions are answered to her satisfaction today    Exam normal today overall  Vulva and vagina atrophic    On vaginoscopy she has stable VAIN I noted at the apex  Certainly no CIS, hi grade VAIN or cancer noted today    Plan:    FU one year for vaginoscopy  See Dr Underwood every October for gyn exam  Call or return sooner prn    HEBER Aguilar MD